# Patient Record
Sex: MALE | Employment: FULL TIME | URBAN - METROPOLITAN AREA
[De-identification: names, ages, dates, MRNs, and addresses within clinical notes are randomized per-mention and may not be internally consistent; named-entity substitution may affect disease eponyms.]

---

## 2019-09-13 ENCOUNTER — TRANSFERRED RECORDS (OUTPATIENT)
Dept: HEALTH INFORMATION MANAGEMENT | Facility: CLINIC | Age: 29
End: 2019-09-13

## 2020-01-27 ENCOUNTER — ANCILLARY PROCEDURE (OUTPATIENT)
Dept: MRI IMAGING | Facility: CLINIC | Age: 30
End: 2020-01-27
Payer: COMMERCIAL

## 2020-01-27 DIAGNOSIS — M25.561 LATERAL KNEE PAIN, RIGHT: ICD-10-CM

## 2020-02-04 ENCOUNTER — TRANSFERRED RECORDS (OUTPATIENT)
Dept: HEALTH INFORMATION MANAGEMENT | Facility: CLINIC | Age: 30
End: 2020-02-04

## 2020-02-06 ENCOUNTER — MEDICAL CORRESPONDENCE (OUTPATIENT)
Dept: HEALTH INFORMATION MANAGEMENT | Facility: CLINIC | Age: 30
End: 2020-02-06

## 2020-02-17 NOTE — TELEPHONE ENCOUNTER
DIAGNOSIS: Rt knee pain, cyst on meniscus, per pt w/ chanell. Records at [facility]. No prvs surgery, MRI in [facility]   APPOINTMENT DATE: 2/21/20   NOTES STATUS DETAILS   OFFICE NOTE from referring provider received  Chanell   OFFICE NOTE from other specialist N/A    DISCHARGE SUMMARY from hospital N/A    DISCHARGE REPORT from the ER N/A    OPERATIVE REPORT N/A    MEDICATION LIST recieved    MRI Internal [facility]:  MRI R Knee 1/27/20   CT SCAN N/A    XRAYS (IMAGES & REPORTS) recieved chanell-9/13/19     Action 2/17/20 MV 2.56pm   Action Taken Records request faxed to Chanell

## 2020-02-21 ENCOUNTER — OFFICE VISIT (OUTPATIENT)
Dept: ORTHOPEDICS | Facility: CLINIC | Age: 30
End: 2020-02-21
Payer: COMMERCIAL

## 2020-02-21 ENCOUNTER — PREP FOR PROCEDURE (OUTPATIENT)
Dept: ORTHOPEDICS | Facility: CLINIC | Age: 30
End: 2020-02-21

## 2020-02-21 ENCOUNTER — PRE VISIT (OUTPATIENT)
Dept: ORTHOPEDICS | Facility: CLINIC | Age: 30
End: 2020-02-21

## 2020-02-21 VITALS — HEIGHT: 69 IN | WEIGHT: 166 LBS | BODY MASS INDEX: 24.59 KG/M2

## 2020-02-21 DIAGNOSIS — M23.000 CYST OF LATERAL MENISCUS OF RIGHT KNEE: Primary | ICD-10-CM

## 2020-02-21 RX ORDER — HYDROXYZINE HYDROCHLORIDE 25 MG/1
TABLET, FILM COATED ORAL
COMMUNITY
Start: 2020-02-17

## 2020-02-21 ASSESSMENT — MIFFLIN-ST. JEOR: SCORE: 1711.73

## 2020-02-21 ASSESSMENT — PATIENT HEALTH QUESTIONNAIRE - PHQ9
10. IF YOU CHECKED OFF ANY PROBLEMS, HOW DIFFICULT HAVE THESE PROBLEMS MADE IT FOR YOU TO DO YOUR WORK, TAKE CARE OF THINGS AT HOME, OR GET ALONG WITH OTHER PEOPLE: SOMEWHAT DIFFICULT
SUM OF ALL RESPONSES TO PHQ QUESTIONS 1-9: 17
SUM OF ALL RESPONSES TO PHQ QUESTIONS 1-9: 17

## 2020-02-21 NOTE — PROGRESS NOTES
"[facility]  Orthopedics  Ray Jean MD  2020     Name: Cosme Gooden  MRN: [id number]  Age: 29 year old  : 1990  Referring provider: [facility]     Chief Complaint: Right knee pain    History of Present Illness:   Cosme Gooden is a 29 year old male who presents today for evaluation of right knee pain. The patient reports that this past October or November he had onset of pain in the right knee and associated stiffness of the knee. He attended physical therapy in November with some improvement of the pain. However, he tried going back to his normal activities such as cycling and swimming, and had worsening of his symptoms with this. Recently he had a day where he could not bend the knee due to pain and because of this could not get up or down the stairs. Patient had an MRI performed 2020 as noted below, which showed a perimeniscal cyst. Overall, the patient states he is otherwise healthy aside from anxiety. Of note, he is soon returning to [facility], after which he will be attending grad school this fall in [place].     Review of Systems:   A 10-point review of systems was obtained and is negative except for as noted in the HPI.     Medications:   HydrOXYzine 25 MG PO tablet, , Disp: , Rfl:      Allergies:  Patient has no known allergies.     Past Medical History:  Anxiety      Past Surgical History:  No pertinent surgical history     Social History:  Patient is a nonsmoker. No smokeless tobacco use.   Patient works as an . He is planning to attend grad school in [place].   Patient is from [facility].     Physical Examination:  Height 1.75 m (5' 8.9\"), weight 75.3 kg (166 lb).  General: Alert, oriented, no distress.  Skin: Cool to touch without erythema, ecchymosis, or lesions. No dystrophic changes.  Neuro: Neurovascularly intact distally. Sensation intact to light touch.  Cardiovascular: Capillary refill brisk.  Right Knee: No effusion. Range " of motion from 0 degrees of extension to 145 degrees of flexion with pain. Slight tenderness along the lateral joint line, no medial joint line tenderness. Negative Lachman s. Stable to varus and valgus stress. No posterior drawer.   Left Knee: No effusion. Range of motion from 0 degrees of extension to 145 degrees of flexion.    Imaging:   MR Knee Right without Contrast (1/27/2020):  1. Loculated joint effusion centered about the intercondylar notch and  the posterior lateral corner of the knee. Perimeniscal cyst  posterolaterally just anterior to the posterior root attachment of the  lateral meniscus. No meniscal tear.  2. ACL, posterior cruciate ligament, medial and lateral meniscus and  articular cartilaginous surfaces are intact and unremarkable.  Per radiology.     Assessment:   29 year old male with a right knee intraarticular cyst, either from the posterior cruciate ligament or lateral meniscus  Not consistent with meniscal tear.  I discussed with the patient that we could proceed with a needle aspiration of the cyst via Interventional Radiology vs arthroscopic resection of the cyst vs nonoperative management. I advised that these cysts can sometimes go away on their own, and that the cyst will likely not cause any damage to the knee over time. The patient is going to think about arthroscopic cyst resection and contact us if he would like to proceed.     Plan:   -I will have my surgery scheduler speak with the patient about the procedure should he decide to go through with it.    -He can follow-up here PRN.        Scribe Disclosure:  I, Silverio Aggarwal, am serving as a scribe to document services personally performed by Ray Jean MD at this visit, based upon the provider's statements to me. All documentation has been reviewed by the aforementioned provider prior to being entered into the official medical record.      Answers for HPI/ROS submitted by the patient on 2/21/2020   If you checked off any  problems, how difficult have these problems made it for you to do your work, take care of things at home, or get along with other people?: Somewhat difficult  PHQ9 TOTAL SCORE: 17

## 2020-02-21 NOTE — LETTER
"  2020      RE: Cosme Gooden   [facility] [address]  [place] [address]       [facility]  Orthopedics  Ray Jean MD  2020     Name: Cosme Gooden  MRN: [id number]  Age: 29 year old  : 1990  Referring provider: [facility]     Chief Complaint: Right knee pain    History of Present Illness:   Cosme Gooden is a 29 year old male who presents today for evaluation of right knee pain. The patient reports that this past October or November he had onset of pain in the right knee and associated stiffness of the knee. He attended physical therapy in November with some improvement of the pain. However, he tried going back to his normal activities such as cycling and swimming, and had worsening of his symptoms with this. Recently he had a day where he could not bend the knee due to pain and because of this could not get up or down the stairs. Patient had an MRI performed 2020 as noted below, which showed a perimeniscal cyst. Overall, the patient states he is otherwise healthy aside from anxiety. Of note, he is soon returning to [facility], after which he will be attending grad school this fall in [place].     Review of Systems:   A 10-point review of systems was obtained and is negative except for as noted in the HPI.     Medications:   HydrOXYzine 25 MG PO tablet, , Disp: , Rfl:      Allergies:  Patient has no known allergies.     Past Medical History:  Anxiety      Past Surgical History:  No pertinent surgical history     Social History:  Patient is a nonsmoker. No smokeless tobacco use.   Patient works as an . He is planning to attend grad school in [place].   Patient is from [facility].     Physical Examination:  Height 1.75 m (5' 8.9\"), weight 75.3 kg (166 lb).  General: Alert, oriented, no distress.  Skin: Cool to touch without erythema, ecchymosis, or lesions. No dystrophic changes.  Neuro: Neurovascularly intact distally. Sensation " intact to light touch.  Cardiovascular: Capillary refill brisk.  Right Knee: No effusion. Range of motion from 0 degrees of extension to 145 degrees of flexion with pain. Slight tenderness along the lateral joint line, no medial joint line tenderness. Negative Lachman s. Stable to varus and valgus stress. No posterior drawer.   Left Knee: No effusion. Range of motion from 0 degrees of extension to 145 degrees of flexion.    Imaging:   MR Knee Right without Contrast (1/27/2020):  1. Loculated joint effusion centered about the intercondylar notch and  the posterior lateral corner of the knee. Perimeniscal cyst  posterolaterally just anterior to the posterior root attachment of the  lateral meniscus. No meniscal tear.  2. ACL, posterior cruciate ligament, medial and lateral meniscus and  articular cartilaginous surfaces are intact and unremarkable.  Per radiology.     Assessment:   29 year old male with a right knee intraarticular cyst, either from the posterior cruciate ligament or lateral meniscus  Not consistent with meniscal tear.  I discussed with the patient that we could proceed with a needle aspiration of the cyst via Interventional Radiology vs arthroscopic resection of the cyst vs nonoperative management. I advised that these cysts can sometimes go away on their own, and that the cyst will likely not cause any damage to the knee over time. The patient is going to think about arthroscopic cyst resection and contact us if he would like to proceed.     Plan:   -I will have my surgery scheduler speak with the patient about the procedure should he decide to go through with it.    -He can follow-up here PRN.        Scribe Disclosure:  I, Silverio Aggarwal, am serving as a scribe to document services personally performed by Ray Jean MD at this visit, based upon the provider's statements to me. All documentation has been reviewed by the aforementioned provider prior to being entered into the official medical  record.      Answers for HPI/ROS submitted by the patient on 2/21/2020   If you checked off any problems, how difficult have these problems made it for you to do your work, take care of things at home, or get along with other people?: Somewhat difficult  PHQ9 TOTAL SCORE: 17      Ray Jean MD

## 2020-02-22 ASSESSMENT — PATIENT HEALTH QUESTIONNAIRE - PHQ9: SUM OF ALL RESPONSES TO PHQ QUESTIONS 1-9: 17

## 2022-06-10 NOTE — ADDENDUM NOTE
Addended by: RIK MEDINA on: 2/23/2020 12:11 PM     Modules accepted: Orders     I will STOP taking the medications listed below when I get home from the hospital:  None